# Patient Record
(demographics unavailable — no encounter records)

---

## 2025-02-26 NOTE — PHYSICAL EXAM
[TextEntry] : General: awake, alert, no acute distress, interactive, cooperative, appropriate for age Head: normocephalic, no signs injury Eyes: + red reflex bilaterally, EOMI, no purulent discharge, no conjunctival or scleral erythema Ears: tympanic membranes normal appearing bilaterally, no ear pit or tag Nose: no discharge Mouth: mucosa moist and pink, oropharynx without erythema Neck: supple, FROM Lungs: clear to auscultation bilaterally, no accessory muscle use Cardiac: normal S1 S2, regular rate and rhythm, no murmur appreciated Abdomen: soft, non tender, non distended, no hepatosplenomegaly  Chest: no gynecomastia  Genitals: mg 4 normal appearing male genitalia, testicles down bilaterally, no varicocele Lymphatics: no abnormal lymph nodes palpated Neuro: no focal deficits, normal tone  Back: no scoliosis noted Skin: no rash, no lesions

## 2025-02-26 NOTE — HISTORY OF PRESENT ILLNESS
[Father] : father [Yes] : Patient goes to dentist yearly [Toothpaste] : Primary Fluoride Source: Toothpaste [No] : Patient has not had sexual intercourse [NO] : No [Uses electronic nicotine delivery system] : does not use electronic nicotine delivery system [Exposure to electronic nicotine delivery system] : no exposure to electronic nicotine delivery system [Uses tobacco] : does not use tobacco [Exposure to tobacco] : no exposure to tobacco [Uses drugs] : does not use drugs  [Drinks alcohol] : does not drink alcohol [FreeTextEntry7] : 16 year routine physical  [FreeTextEntry1] : lives with parents in grade 11th  doing well in school, likes teacher, has friends, no bullying no problems in school identified, no ADHD concerns participates in couple of sports and clubs and 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 appetite good, eats variety of foods, 3 meals a day and snacks, consumes fruits, vegetables, meat, dairy no sleep concerns, goes to dentist regularly, brushing teeth 1-2 x a day (tries 2 x a day) patient not having any fevers without a cause, pain that wakes them in the night, or night sweats. Urinating and stooling normally, no chest pain, palpitations or syncope with exercise. Parent(s) have no current concerns or issues